# Patient Record
(demographics unavailable — no encounter records)

---

## 2024-12-13 NOTE — IMAGING
[de-identified] :   ----------------------------------------------------------------------------   Cervical spine exam:   Inspection:        (neg) Abnormal alignment (kyphosis/lordosis)   (neg) Atrophy ROM:    Pain:               (neg) Flexion/extension    (neg) Rotation    Stiffness:        (neg) Flexion/extension    (neg) Rotation Tenderness:    Trapezial:       (+) Right    (neg) Left    (neg) Midline    Paraspinal:     (neg) Right    (neg) Left    Rhomboid :     (+) Right    (neg) Left    Scapula:         (+) Right   (neg) Left Strength:    Deltoid:          Right: 5/5   .  Left: 5/5    Biceps:          Right: 5/5   .  Left: 5/5    Triceps:         Right: 5/5   .  Left: 5/5    Wrist flex      Right: 5/5   .   Left: 5/5    Wrist ext:      Right: 5/5   .   Left: 5/5    Hand:            Right: 5/5   .   Left: 5/5 Neuro: DTR's wnl.  Sensation to light touch grossly in tact in all distributions.    (neg) Jocelin's    (neg) Spurling    (neg) Lhermitte Vascularity: Extremity warm and well perfused Gait: normal      ----------------------------------------------------------------------------   Right shoulder exam:   Skin: no significant pertinent finding Inspection: no obvious deformity, no obvious masses, no swelling, no effusion, no atrophy ROM:     FF: 180    ER: 70    IR: T12 Tenderness:    (neg) Anterior/Biceps:    (neg) Posterior    (neg) Lateral    (+) Trapezius    (neg) Scapula    (neg) AC joint    (neg) Crepitus with ROM Stability:    (neg) Translation    (neg) Apprehension    (neg) Clicking Additional tests:    (neg) Neer's    (neg)Bailee's    (neg) Preston's    (neg) Speed    (neg) Cross chest adduction Strength:    FF: 5/5    ER: 5/5    IR: 5/5    Biceps: 5/5    Triceps: 5/5    Distal: 5/5 Neuro: In tact to light touch throughout Vascularity: Extremity warm and well perfused   [Straightening consistent with spasm] : Straightening consistent with spasm [Right] : right shoulder [There are no fractures, subluxations or dislocations. No significant abnormalities are seen] : There are no fractures, subluxations or dislocations. No significant abnormalities are seen [Type 1 acromion] : Type 1 acromion

## 2024-12-13 NOTE — DATA REVIEWED
[MRI] : MRI [Cervical Spine] : cervical spine [FreeTextEntry1] : Feb 2024 - report states small Left side disc osteophyte complex, mild degenerative changes

## 2024-12-13 NOTE — DISCUSSION/SUMMARY
[Medication Risks Reviewed] : Medication risks reviewed [de-identified] :  Plan for PT Discussed anti-inflammatory use  and prev prescribed methocarbamol use  Rx:  Diclofenac  Pt may return to work with provided light duty support and with extra support  ----------------------------------------------------------------------------   All relevant imaging studies pertinent to today's visit, including x-rays, MRI's and/or other advanced imaging studies (CT/etc) were independently interpreted and reviewed with the patient as needed. Implications of the studies together with the patient's clinical picture were discussed to formulate a working diagnosis and management options were detailed.   The patient and/or guardian was advised of the diagnosis.  The natural history of the pathology was explained in full. All questions were answered.  The risks and benefits of conservative and interventional treatment alternatives were explained to the patient   The patient and/or guardian was advised if any advanced diagnostic/imaging study (MRI/CT/etc) is ordered to evaluate potential pathology in the affected area(s), they should follow up in the office to review the results of the study and determine further management that may be indicated.     ----------------------------------------------------------------------------  Patient warned of specific risks of medication related to bleeding, GI issues, increase blood pressure, and cardiac risks in addition to additional risks.  Patient advised to discuss with PMD  if any presence of stated issues.
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
